# Patient Record
Sex: FEMALE | Race: WHITE | NOT HISPANIC OR LATINO
[De-identification: names, ages, dates, MRNs, and addresses within clinical notes are randomized per-mention and may not be internally consistent; named-entity substitution may affect disease eponyms.]

---

## 2021-05-12 ENCOUNTER — APPOINTMENT (OUTPATIENT)
Dept: VASCULAR SURGERY | Facility: CLINIC | Age: 61
End: 2021-05-12
Payer: COMMERCIAL

## 2021-05-12 PROBLEM — Z00.00 ENCOUNTER FOR PREVENTIVE HEALTH EXAMINATION: Status: ACTIVE | Noted: 2021-05-12

## 2021-05-12 PROCEDURE — 99203 OFFICE O/P NEW LOW 30 MIN: CPT

## 2021-05-12 PROCEDURE — 99072 ADDL SUPL MATRL&STAF TM PHE: CPT

## 2022-04-27 ENCOUNTER — APPOINTMENT (OUTPATIENT)
Dept: VASCULAR SURGERY | Facility: CLINIC | Age: 62
End: 2022-04-27
Payer: COMMERCIAL

## 2022-04-27 ENCOUNTER — APPOINTMENT (OUTPATIENT)
Dept: VASCULAR SURGERY | Facility: CLINIC | Age: 62
End: 2022-04-27

## 2022-04-27 PROCEDURE — 99214 OFFICE O/P EST MOD 30 MIN: CPT

## 2022-09-14 ENCOUNTER — APPOINTMENT (OUTPATIENT)
Dept: VASCULAR SURGERY | Facility: CLINIC | Age: 62
End: 2022-09-14

## 2022-09-14 PROCEDURE — 99214 OFFICE O/P EST MOD 30 MIN: CPT

## 2023-02-28 RX ORDER — ALPRAZOLAM 0.5 MG/1
0.5 TABLET ORAL
Qty: 2 | Refills: 0 | Status: ACTIVE | COMMUNITY
Start: 2023-02-28 | End: 1900-01-01

## 2023-03-03 ENCOUNTER — APPOINTMENT (OUTPATIENT)
Dept: VASCULAR SURGERY | Facility: CLINIC | Age: 63
End: 2023-03-03
Payer: COMMERCIAL

## 2023-03-03 VITALS — DIASTOLIC BLOOD PRESSURE: 86 MMHG | HEART RATE: 56 BPM | SYSTOLIC BLOOD PRESSURE: 127 MMHG

## 2023-03-03 VITALS — DIASTOLIC BLOOD PRESSURE: 83 MMHG | SYSTOLIC BLOOD PRESSURE: 129 MMHG | HEART RATE: 66 BPM

## 2023-03-03 PROCEDURE — 36475 ENDOVENOUS RF 1ST VEIN: CPT | Mod: RT

## 2023-03-03 NOTE — PROCEDURE
[FreeTextEntry1] : RLE GSV RFA  [FreeTextEntry2] : varicose veins with complications. venous reflux  [FreeTextEntry3] : The patient was seen in the waiting room where the consent was obtained. She was  taken to the procedure room where a timeout was called to verify her identity and the laterality of the procedure. The patient's right leg was  interrogated under ultrasound and an appropriate place for cannulation was identified. The leg  was  prepped  with chloroprep and draped in the standard fashion. Under ultrasound guidance  1% plain lidocaine was injected  in the proximal calf. Access was then obtained with a 7 FR sheath in the standard fashion using a micropuncture technique. Inner dilator was removed. The sheath was irrigated. RFA catheter was placed to the SFJ and withdrawn 3.0 cm from the junction.  Tumescence was administered around the saphenous vein under ultrasound guidance. The ablation was  performed Using a total of 7 cycles. The catheter and sheath were withdrawn. Complete hemostasis was achieved with manual compression. Steri strips were applied to catheter insertion site. Leg was wrapped in Kerlix and an ace wrap. Patient tolerated the procedure well and verbalized understanding of post-procedure instructions. The patient was discharged in stable condition.\par

## 2023-03-21 ENCOUNTER — APPOINTMENT (OUTPATIENT)
Dept: VASCULAR SURGERY | Facility: CLINIC | Age: 63
End: 2023-03-21
Payer: COMMERCIAL

## 2023-03-21 PROCEDURE — 93971 EXTREMITY STUDY: CPT

## 2023-03-21 PROCEDURE — 99214 OFFICE O/P EST MOD 30 MIN: CPT

## 2023-03-27 NOTE — ASSESSMENT
[FreeTextEntry1] : 63 year-old female approximately 3 weeks status-post an ablation of the right GSV.  She is doing well post-procedure but continues to have residual varicose veins. I explained that if the veins do not decompress over the next couple of weeks, she can undergo either a Varithena injection vs. a stab phlebectomy. Risks and benefits of both treatment options were discussed with the patient in detail she would prefer to proceed with Verithena. We will assess the need for further treatment when she follows up to undergo the ablation of her left GSV in two weeks.

## 2023-03-27 NOTE — HISTORY OF PRESENT ILLNESS
[FreeTextEntry1] : 63 year-old female returns in follow up for bilateral lower extremity symptomatic varicose veins. She is now status-post an ablation of the right GSV approximately 3 weeks ago. The patient is doing well post-procedure. She reports slight decompression of her varicose veins and improvement of her symptoms however, she still has significant residual varicose veins of RLE that are persistently symptomatic despite recent RFA and compression. The patient continues to have bilateral lower extremity edema. The patient has been complaint with compression. She is scheduled to undergo an ablation of the left GSV in two weeks.

## 2023-03-27 NOTE — PHYSICAL EXAM
[Normal Breath Sounds] : Normal breath sounds [Ankle Swelling (On Exam)] : present [Ankle Swelling Bilaterally] : bilaterally  [Varicose Veins Of Lower Extremities] : bilaterally [Alert] : alert [Oriented to Person] : oriented to person [Oriented to Place] : oriented to place [Oriented to Time] : oriented to time [Calm] : calm [JVD] : no jugular venous distention  [Abdomen Masses] : No abdominal masses [de-identified] : NAD [de-identified] : NCAT, Extraocular muscles in tact [de-identified] : Soft, NT, ND. No guarding. No HSM. [de-identified] : Normal muscle bulk and tone. FROM in all extremities. [de-identified] : varicose veins BL [de-identified] : AAOx3, CN II-XII intact. Strength 5/5 in all 4 extremities. Sensation intact throughout.

## 2023-04-07 ENCOUNTER — APPOINTMENT (OUTPATIENT)
Dept: VASCULAR SURGERY | Facility: CLINIC | Age: 63
End: 2023-04-07
Payer: COMMERCIAL

## 2023-04-07 VITALS — DIASTOLIC BLOOD PRESSURE: 83 MMHG | SYSTOLIC BLOOD PRESSURE: 135 MMHG | HEART RATE: 59 BPM

## 2023-04-07 VITALS — HEART RATE: 52 BPM | DIASTOLIC BLOOD PRESSURE: 72 MMHG | SYSTOLIC BLOOD PRESSURE: 116 MMHG

## 2023-04-07 PROCEDURE — 36475 ENDOVENOUS RF 1ST VEIN: CPT | Mod: LT

## 2023-04-11 NOTE — PROCEDURE
[FreeTextEntry1] : L GSV RFA  [FreeTextEntry2] : venous insufficiency [FreeTextEntry3] : The patient was seen in the waiting room where the consent was obtained. She was  taken to the procedure room where a timeout was called to verify her identity and the laterality of the procedure. The patient's left leg was  interrogated under ultrasound and an appropriate place for cannulation was identified. The leg  was  prepped  with chloroprep and draped in the standard fashion. Under ultrasound guidance  1% plain lidocaine was injected  in the distal thigh.  Access was then obtained with a 7 FR sheath in the standard fashion using a micropuncture technique. Inner dilator was removed. The sheath was irrigated. RFA catheter was placed to the SFJ and withdrawn 3.0 cm from the junction.  Tumescence was administered around the saphenous vein under ultrasound guidance. The ablation was  performed Using a total of 6 cycles. The catheter and sheath were withdrawn. Complete hemostasis was achieved with manual compression. Steri strips were applied to catheter insertion site. Leg was wrapped in Kerlix and an ace wrap. Patient tolerated the procedure well and verbalized understanding of post-procedure instructions. The patient was discharged in stable condition.\par

## 2023-04-18 ENCOUNTER — APPOINTMENT (OUTPATIENT)
Dept: VASCULAR SURGERY | Facility: CLINIC | Age: 63
End: 2023-04-18
Payer: COMMERCIAL

## 2023-04-18 DIAGNOSIS — I82.409 ACUTE EMBOLISM AND THROMBOSIS OF UNSPECIFIED DEEP VEINS OF UNSPECIFIED LOWER EXTREMITY: ICD-10-CM

## 2023-04-18 PROCEDURE — 99213 OFFICE O/P EST LOW 20 MIN: CPT

## 2023-04-18 PROCEDURE — 93971 EXTREMITY STUDY: CPT

## 2023-04-18 RX ORDER — APIXABAN 5 MG (74)
5 KIT ORAL
Qty: 90 | Refills: 0 | Status: ACTIVE | COMMUNITY
Start: 2023-04-18

## 2023-04-19 PROBLEM — I82.409 DVT (DEEP VENOUS THROMBOSIS): Status: ACTIVE | Noted: 2023-04-18

## 2023-04-19 NOTE — ASSESSMENT
[FreeTextEntry1] : 63 year-old female approximately 2 weeks status-post an ablation of the left GSV.  She is doing well post-procedure but continues to have residual varicose veins. She underwent a left lower extremity venous duplex that demonstrated an appropriately ablated GSV with a head.\par \par I recommended that the patient start Eliquis. The risks and benefits of the medication, including excessive bleeding, were discussed with the patient in detail. The patient will stop anticoagulation before her cataract surgery in July.  I recommended she postpone a Varithena injection on the right while her left leg is healing and the patient agrees.\par \par She will follow up in 3-4 weeks before she travels for a repeat venous duplex.\par

## 2023-04-19 NOTE — PHYSICAL EXAM
[Normal Breath Sounds] : Normal breath sounds [Ankle Swelling (On Exam)] : present [Ankle Swelling Bilaterally] : bilaterally  [Varicose Veins Of Lower Extremities] : bilaterally [Alert] : alert [Oriented to Person] : oriented to person [Oriented to Place] : oriented to place [Oriented to Time] : oriented to time [Calm] : calm [JVD] : no jugular venous distention  [Abdomen Masses] : No abdominal masses [de-identified] : NAD [de-identified] : NCAT, Extraocular muscles in tact [FreeTextEntry1] : 2+ bilateral carotid, radial, femoral, popliteal, DP and PT pulses.\par Bilateral lower extremity 0 edema.\par Varicose veins greater than 3 mm in diameter present bilaterally.  Evidence of thrombosed varicose veins in the left lower extremity after ablation.\par  [de-identified] : Soft, NT, ND. No guarding. No HSM. [de-identified] : Normal muscle bulk and tone. FROM in all extremities. [de-identified] : varicose veins BL, catheter insertion site, clean, dry, and intact [de-identified] : AAOx3, CN II-XII intact. Strength 5/5 in all 4 extremities. Sensation intact throughout.

## 2023-04-19 NOTE — DATA REVIEWED
[FreeTextEntry1] : Left lower extremity venous duplex ordered, performed, and reviewed -  appropriately ablated GSV, SVT in GSV

## 2023-04-19 NOTE — PROCEDURE
[FreeTextEntry1] : Venous ultrasound ordered performed and reviewed.  Patient has evidence of a head and left lower extremity.  She also has greater saphenous vein appropriately ablated.  Patient has large varicosities that are in the process of thrombosis.

## 2023-04-19 NOTE — HISTORY OF PRESENT ILLNESS
[FreeTextEntry1] : 63 year-old female returns in follow up for bilateral lower extremity symptomatic varicose veins. She is now status-post an ablation of the right GSV approximately 3 weeks ago. The patient is doing well post-procedure. She reports slight decompression of her varicose veins and improvement of her symptoms however, she still has significant residual varicose veins of RLE that are persistently symptomatic despite recent RFA and compression. The patient continues to have bilateral lower extremity edema. The patient has been complaint with compression.  [de-identified] : 63 year old female returns in follow up for bilateral symptomatic varicose veins and edema. The patient is 1.5 weeks status post an ablation of the left GSV. The patient is doing well post-procedure but continues to reports pain and tenderness. She reports decompression of her varicose veins and no longer experiences cramping at rest.  However she does note tenderness in the distribution of varicose veins likely due to thrombosis of the vein status post ablation.  The patient reports significantly improved symptoms in the right leg. The patient has been compliant with compression therapy.

## 2023-05-09 ENCOUNTER — APPOINTMENT (OUTPATIENT)
Dept: VASCULAR SURGERY | Facility: CLINIC | Age: 63
End: 2023-05-09
Payer: COMMERCIAL

## 2023-05-09 VITALS — HEIGHT: 60 IN | WEIGHT: 155 LBS | BODY MASS INDEX: 30.43 KG/M2

## 2023-05-09 DIAGNOSIS — I87.2 VENOUS INSUFFICIENCY (CHRONIC) (PERIPHERAL): ICD-10-CM

## 2023-05-09 DIAGNOSIS — I83.899 VARICOSE VEINS OF UNSPECIFIED LOWER EXTREMITY WITH OTHER COMPLICATIONS: ICD-10-CM

## 2023-05-09 PROCEDURE — 99214 OFFICE O/P EST MOD 30 MIN: CPT

## 2023-05-09 PROCEDURE — 93971 EXTREMITY STUDY: CPT

## 2023-05-09 NOTE — ASSESSMENT
[FreeTextEntry1] : 63 year-old female approximately 2 weeks status-post an ablation of the left GSV.  She is doing well post-procedure but continues to have residual varicose veins bilaterally. She underwent a left lower extremity venous duplex that demonstrated an appropriately ablated GSV and a resolved EHIT.\par \par The patient will discontinue Eliquis. She is a candidate for an RFA  ablation of the left SSV followed by a stab phlebectomy of her left medial thigh and right posterior calf varicose veins. The risks and benefits of the procedures, including infection, bleeding, and DVT, were discussed with the patient in detail. She would like to undergo a left SSV ablation in the near future but postpone the stab phlebectomies until the fall. She will continue wearing compression garments until the procedure.\par \par 32 min

## 2023-05-09 NOTE — PHYSICAL EXAM
[Normal Breath Sounds] : Normal breath sounds [Varicose Veins Of Lower Extremities] : bilaterally [Alert] : alert [Oriented to Person] : oriented to person [Oriented to Place] : oriented to place [Oriented to Time] : oriented to time [Calm] : calm [JVD] : no jugular venous distention  [Abdomen Masses] : No abdominal masses [de-identified] : NAD [de-identified] : NCAT, Extraocular muscles in tact [FreeTextEntry1] : 2+ bilateral carotid, radial, femoral, popliteal, DP and PT pulses.\par Bilateral lower extremity 0 edema.\par Varicose veins greater than 3 mm in diameter present bilaterally.  \par  [de-identified] : Soft, NT, ND. No guarding. No HSM. [de-identified] : No CVA tenderness [de-identified] : Normal muscle bulk and tone. FROM in all extremities. [de-identified] : varicose veins BL [de-identified] : AAOx3, CN II-XII intact. Strength 5/5 in all 4 extremities. Sensation intact throughout. [de-identified] : Appropriate  affect.

## 2023-05-09 NOTE — HISTORY OF PRESENT ILLNESS
[FreeTextEntry1] : 63 year-old female returns in follow up for bilateral lower extremity symptomatic varicose veins. She is now status-post an ablation of the right GSV approximately 3 weeks ago. The patient is doing well post-procedure. She reports slight decompression of her varicose veins and improvement of her symptoms however, she still has significant residual varicose veins of RLE that are persistently symptomatic despite recent RFA and compression. The patient continues to have bilateral lower extremity edema. The patient has been complaint with compression.  [de-identified] : 63 year old female returns in follow up for bilateral symptomatic varicose veins and edema. The patient is  approximately one month weeks status post an ablation of the left GSV. The patient is doing well post-procedure. The patient reports significantly improved symptoms in the left leg but continues to experience intermittent cramping and pain secondary to residual varicose veins. She continues to reports improved symptoms on the right. The patient continues to be compliant with compression therapy and take Eiliquis as prescribed. The patient underwent a repeat venous duplex and is here to discuss the results.

## 2023-05-09 NOTE — PROCEDURE
[FreeTextEntry1] : Venous ultrasound ordered performed and reviewed.   appropriately ablated GSV, EHIT no longer present

## 2023-06-02 ENCOUNTER — APPOINTMENT (OUTPATIENT)
Dept: VASCULAR SURGERY | Facility: CLINIC | Age: 63
End: 2023-06-02

## 2024-09-27 ENCOUNTER — APPOINTMENT (OUTPATIENT)
Dept: BARIATRICS | Facility: CLINIC | Age: 64
End: 2024-09-27
Payer: COMMERCIAL

## 2024-09-27 VITALS
SYSTOLIC BLOOD PRESSURE: 144 MMHG | DIASTOLIC BLOOD PRESSURE: 84 MMHG | BODY MASS INDEX: 28.83 KG/M2 | WEIGHT: 147.6 LBS | HEART RATE: 74 BPM | OXYGEN SATURATION: 99 %

## 2024-09-27 DIAGNOSIS — Z98.84 BARIATRIC SURGERY STATUS: ICD-10-CM

## 2024-09-27 PROCEDURE — 99203 OFFICE O/P NEW LOW 30 MIN: CPT

## 2024-09-27 RX ORDER — SEMAGLUTIDE 1 MG/.5ML
1 INJECTION, SOLUTION SUBCUTANEOUS
Refills: 0 | Status: ACTIVE | COMMUNITY

## 2024-09-27 RX ORDER — SIMVASTATIN 80 MG/1
TABLET, FILM COATED ORAL
Refills: 0 | Status: ACTIVE | COMMUNITY

## 2024-09-27 RX ORDER — METOPROLOL TARTRATE 75 MG/1
TABLET, FILM COATED ORAL
Refills: 0 | Status: ACTIVE | COMMUNITY

## 2024-09-27 NOTE — ASSESSMENT
[FreeTextEntry1] : 65 y/o F wants lap band drained and is having reflux, lap band placed in 2007. comes in long time band pt from [facility] complaining of increase throwing up also on WeGovy took 1cc of lap band fluid will get upper GI series, recommends endoscopy in the next year or two, follow up if symptoms dont resolve.

## 2024-09-27 NOTE — PLAN
[FreeTextEntry1] : 65 y/o F wants lap band drained and is having reflux, lap band placed in 2007. comes in long time band pt from [facility] complaining of increase throwing up also on WeGovy took 1cc of lap band fluid will get upper GI series, recommends endoscopy in the next year or two, follow up if symptoms dont resolve.

## 2024-09-27 NOTE — END OF VISIT
[FreeTextEntry3] : All medical record entries made by the Scribe were at my, Dr. Grover Mittal, direction and personally dictated by me on Sep 27 2024 9:30AM. I have reviewed the chart and agree that the record accurately reflects my personal performance of the history, physical exam, assessment and plan. I have also personally directed, reviewed, and agreed with the chart.

## 2024-09-27 NOTE — ADDENDUM
[FreeTextEntry1] : Documented by Brenda Valencia acting as a Scribe for Dr. Grover Mittal on Sep 27 2024 9:30AM